# Patient Record
Sex: FEMALE | Race: WHITE
[De-identification: names, ages, dates, MRNs, and addresses within clinical notes are randomized per-mention and may not be internally consistent; named-entity substitution may affect disease eponyms.]

---

## 2018-01-16 ENCOUNTER — RECORDS - HEALTHEAST (OUTPATIENT)
Dept: ADMINISTRATIVE | Facility: OTHER | Age: 48
End: 2018-01-16

## 2018-02-13 ENCOUNTER — RECORDS - HEALTHEAST (OUTPATIENT)
Dept: ADMINISTRATIVE | Facility: OTHER | Age: 48
End: 2018-02-13

## 2018-02-27 ENCOUNTER — RECORDS - HEALTHEAST (OUTPATIENT)
Dept: ADMINISTRATIVE | Facility: OTHER | Age: 48
End: 2018-02-27

## 2018-03-01 ENCOUNTER — RECORDS - HEALTHEAST (OUTPATIENT)
Dept: ADMINISTRATIVE | Facility: OTHER | Age: 48
End: 2018-03-01

## 2018-03-14 ENCOUNTER — RECORDS - HEALTHEAST (OUTPATIENT)
Dept: ADMINISTRATIVE | Facility: OTHER | Age: 48
End: 2018-03-14

## 2018-03-15 ENCOUNTER — RECORDS - HEALTHEAST (OUTPATIENT)
Dept: ADMINISTRATIVE | Facility: OTHER | Age: 48
End: 2018-03-15

## 2018-03-20 ENCOUNTER — RECORDS - HEALTHEAST (OUTPATIENT)
Dept: ADMINISTRATIVE | Facility: OTHER | Age: 48
End: 2018-03-20

## 2018-04-07 ENCOUNTER — RECORDS - HEALTHEAST (OUTPATIENT)
Dept: ADMINISTRATIVE | Facility: OTHER | Age: 48
End: 2018-04-07

## 2018-04-09 ENCOUNTER — RECORDS - HEALTHEAST (OUTPATIENT)
Dept: ADMINISTRATIVE | Facility: OTHER | Age: 48
End: 2018-04-09

## 2018-05-10 ENCOUNTER — RECORDS - HEALTHEAST (OUTPATIENT)
Dept: ADMINISTRATIVE | Facility: OTHER | Age: 48
End: 2018-05-10

## 2018-05-10 LAB
CHOLEST SERPL-MCNC: 195 MG/DL (ref 100–199)
HDLC SERPL-MCNC: 46 MG/DL
HPV_EXT - HISTORICAL: NORMAL
LDLC SERPL CALC-MCNC: 113 MG/DL
NON HDL CHOL. (LDL+VLDL): 149 MG/DL
PAP SMEAR - HIM PATIENT REPORTED: NORMAL
TRIGLYCERIDES (HISTORICAL CONVERSION): 182 MG/DL

## 2018-07-10 ENCOUNTER — RECORDS - HEALTHEAST (OUTPATIENT)
Dept: ADMINISTRATIVE | Facility: OTHER | Age: 48
End: 2018-07-10

## 2018-07-26 ENCOUNTER — RECORDS - HEALTHEAST (OUTPATIENT)
Dept: ADMINISTRATIVE | Facility: OTHER | Age: 48
End: 2018-07-26

## 2018-07-30 ENCOUNTER — RECORDS - HEALTHEAST (OUTPATIENT)
Dept: ADMINISTRATIVE | Facility: OTHER | Age: 48
End: 2018-07-30

## 2018-08-03 ENCOUNTER — RECORDS - HEALTHEAST (OUTPATIENT)
Dept: ADMINISTRATIVE | Facility: OTHER | Age: 48
End: 2018-08-03

## 2018-08-10 ENCOUNTER — RECORDS - HEALTHEAST (OUTPATIENT)
Dept: ADMINISTRATIVE | Facility: OTHER | Age: 48
End: 2018-08-10

## 2018-09-07 ENCOUNTER — RECORDS - HEALTHEAST (OUTPATIENT)
Dept: ADMINISTRATIVE | Facility: OTHER | Age: 48
End: 2018-09-07

## 2018-10-23 ENCOUNTER — RECORDS - HEALTHEAST (OUTPATIENT)
Dept: ADMINISTRATIVE | Facility: OTHER | Age: 48
End: 2018-10-23

## 2018-12-17 ENCOUNTER — RECORDS - HEALTHEAST (OUTPATIENT)
Dept: ADMINISTRATIVE | Facility: OTHER | Age: 48
End: 2018-12-17

## 2019-01-04 ENCOUNTER — RECORDS - HEALTHEAST (OUTPATIENT)
Dept: ADMINISTRATIVE | Facility: OTHER | Age: 49
End: 2019-01-04

## 2019-01-08 ENCOUNTER — RECORDS - HEALTHEAST (OUTPATIENT)
Dept: ADMINISTRATIVE | Facility: OTHER | Age: 49
End: 2019-01-08

## 2019-01-15 ENCOUNTER — RECORDS - HEALTHEAST (OUTPATIENT)
Dept: ADMINISTRATIVE | Facility: OTHER | Age: 49
End: 2019-01-15

## 2019-01-18 ENCOUNTER — RECORDS - HEALTHEAST (OUTPATIENT)
Dept: ADMINISTRATIVE | Facility: OTHER | Age: 49
End: 2019-01-18

## 2019-01-29 ENCOUNTER — RECORDS - HEALTHEAST (OUTPATIENT)
Dept: ADMINISTRATIVE | Facility: OTHER | Age: 49
End: 2019-01-29

## 2019-02-13 ENCOUNTER — RECORDS - HEALTHEAST (OUTPATIENT)
Dept: ADMINISTRATIVE | Facility: OTHER | Age: 49
End: 2019-02-13

## 2019-03-11 ENCOUNTER — RECORDS - HEALTHEAST (OUTPATIENT)
Dept: ADMINISTRATIVE | Facility: OTHER | Age: 49
End: 2019-03-11

## 2019-03-12 ENCOUNTER — RECORDS - HEALTHEAST (OUTPATIENT)
Dept: ADMINISTRATIVE | Facility: OTHER | Age: 49
End: 2019-03-12

## 2019-03-15 ENCOUNTER — RECORDS - HEALTHEAST (OUTPATIENT)
Dept: ADMINISTRATIVE | Facility: OTHER | Age: 49
End: 2019-03-15

## 2019-04-03 ENCOUNTER — RECORDS - HEALTHEAST (OUTPATIENT)
Dept: ADMINISTRATIVE | Facility: OTHER | Age: 49
End: 2019-04-03

## 2019-05-23 ENCOUNTER — RECORDS - HEALTHEAST (OUTPATIENT)
Dept: ADMINISTRATIVE | Facility: OTHER | Age: 49
End: 2019-05-23

## 2019-05-28 ENCOUNTER — RECORDS - HEALTHEAST (OUTPATIENT)
Dept: ADMINISTRATIVE | Facility: OTHER | Age: 49
End: 2019-05-28

## 2019-06-03 ENCOUNTER — RECORDS - HEALTHEAST (OUTPATIENT)
Dept: ADMINISTRATIVE | Facility: OTHER | Age: 49
End: 2019-06-03

## 2019-07-16 ENCOUNTER — RECORDS - HEALTHEAST (OUTPATIENT)
Dept: ADMINISTRATIVE | Facility: OTHER | Age: 49
End: 2019-07-16

## 2019-10-03 ENCOUNTER — RECORDS - HEALTHEAST (OUTPATIENT)
Dept: ADMINISTRATIVE | Facility: OTHER | Age: 49
End: 2019-10-03

## 2019-11-11 ENCOUNTER — RECORDS - HEALTHEAST (OUTPATIENT)
Dept: ADMINISTRATIVE | Facility: OTHER | Age: 49
End: 2019-11-11

## 2019-11-25 ENCOUNTER — RECORDS - HEALTHEAST (OUTPATIENT)
Dept: ADMINISTRATIVE | Facility: OTHER | Age: 49
End: 2019-11-25

## 2019-11-25 LAB — HBA1C MFR BLD: 5.5 % (ref 0–5.6)

## 2019-12-17 ENCOUNTER — RECORDS - HEALTHEAST (OUTPATIENT)
Dept: ADMINISTRATIVE | Facility: OTHER | Age: 49
End: 2019-12-17

## 2019-12-28 ENCOUNTER — RECORDS - HEALTHEAST (OUTPATIENT)
Dept: ADMINISTRATIVE | Facility: OTHER | Age: 49
End: 2019-12-28

## 2020-01-06 ENCOUNTER — RECORDS - HEALTHEAST (OUTPATIENT)
Dept: ADMINISTRATIVE | Facility: OTHER | Age: 50
End: 2020-01-06

## 2020-01-19 ENCOUNTER — RECORDS - HEALTHEAST (OUTPATIENT)
Dept: ADMINISTRATIVE | Facility: OTHER | Age: 50
End: 2020-01-19

## 2020-01-22 ENCOUNTER — RECORDS - HEALTHEAST (OUTPATIENT)
Dept: ADMINISTRATIVE | Facility: OTHER | Age: 50
End: 2020-01-22

## 2020-01-24 ENCOUNTER — RECORDS - HEALTHEAST (OUTPATIENT)
Dept: ADMINISTRATIVE | Facility: OTHER | Age: 50
End: 2020-01-24

## 2020-01-27 ENCOUNTER — RECORDS - HEALTHEAST (OUTPATIENT)
Dept: ADMINISTRATIVE | Facility: OTHER | Age: 50
End: 2020-01-27

## 2020-01-27 ENCOUNTER — RECORDS - HEALTHEAST (OUTPATIENT)
Dept: HEALTH INFORMATION MANAGEMENT | Facility: CLINIC | Age: 50
End: 2020-01-27

## 2020-01-28 ENCOUNTER — RECORDS - HEALTHEAST (OUTPATIENT)
Dept: ADMINISTRATIVE | Facility: OTHER | Age: 50
End: 2020-01-28

## 2020-01-28 LAB
CHOLEST SERPL-MCNC: 191 MG/DL (ref 0–200)
HDLC SERPL-MCNC: 46 MG/DL (ref 40–60)
LDLC SERPL CALC-MCNC: 123 MG/DL
TRIGLYCERIDES (HISTORICAL CONVERSION): 122 MG/DL (ref 0–200)

## 2020-01-29 ENCOUNTER — RECORDS - HEALTHEAST (OUTPATIENT)
Dept: ADMINISTRATIVE | Facility: OTHER | Age: 50
End: 2020-01-29

## 2020-02-05 ENCOUNTER — RECORDS - HEALTHEAST (OUTPATIENT)
Dept: ADMINISTRATIVE | Facility: OTHER | Age: 50
End: 2020-02-05

## 2020-02-13 ENCOUNTER — RECORDS - HEALTHEAST (OUTPATIENT)
Dept: ADMINISTRATIVE | Facility: OTHER | Age: 50
End: 2020-02-13

## 2020-02-17 ENCOUNTER — RECORDS - HEALTHEAST (OUTPATIENT)
Dept: ADMINISTRATIVE | Facility: OTHER | Age: 50
End: 2020-02-17

## 2020-02-18 ENCOUNTER — RECORDS - HEALTHEAST (OUTPATIENT)
Dept: ADMINISTRATIVE | Facility: OTHER | Age: 50
End: 2020-02-18

## 2020-03-03 ENCOUNTER — RECORDS - HEALTHEAST (OUTPATIENT)
Dept: ADMINISTRATIVE | Facility: OTHER | Age: 50
End: 2020-03-03

## 2020-03-11 ENCOUNTER — HEALTH MAINTENANCE LETTER (OUTPATIENT)
Age: 50
End: 2020-03-11

## 2020-03-12 ENCOUNTER — OFFICE VISIT - HEALTHEAST (OUTPATIENT)
Dept: INTERNAL MEDICINE | Facility: CLINIC | Age: 50
End: 2020-03-12

## 2020-03-12 DIAGNOSIS — K75.81 NASH (NONALCOHOLIC STEATOHEPATITIS): ICD-10-CM

## 2020-03-12 DIAGNOSIS — R17 ELEVATED BILIRUBIN: ICD-10-CM

## 2020-03-12 DIAGNOSIS — Z90.49 HX OF CHOLECYSTECTOMY: ICD-10-CM

## 2020-03-12 DIAGNOSIS — K21.00 GASTROESOPHAGEAL REFLUX DISEASE WITH ESOPHAGITIS: ICD-10-CM

## 2020-03-12 DIAGNOSIS — M25.561 ACUTE PAIN OF BOTH KNEES: ICD-10-CM

## 2020-03-12 DIAGNOSIS — Z76.89 ESTABLISHING CARE WITH NEW DOCTOR, ENCOUNTER FOR: ICD-10-CM

## 2020-03-12 DIAGNOSIS — M25.562 ACUTE PAIN OF BOTH KNEES: ICD-10-CM

## 2020-03-12 DIAGNOSIS — K52.9 CHRONIC DIARRHEA: ICD-10-CM

## 2020-03-12 DIAGNOSIS — G47.33 OSA (OBSTRUCTIVE SLEEP APNEA): ICD-10-CM

## 2020-03-12 DIAGNOSIS — R79.89 ABNORMAL LIVER FUNCTION TESTS: ICD-10-CM

## 2020-03-12 LAB
ALBUMIN SERPL-MCNC: 3.6 G/DL (ref 3.5–5)
ALP SERPL-CCNC: 62 U/L (ref 45–120)
ALT SERPL W P-5'-P-CCNC: 47 U/L (ref 0–45)
AMYLASE SERPL-CCNC: 41 U/L (ref 5–120)
ANION GAP SERPL CALCULATED.3IONS-SCNC: 13 MMOL/L (ref 5–18)
AST SERPL W P-5'-P-CCNC: 38 U/L (ref 0–40)
BILIRUB SERPL-MCNC: 0.7 MG/DL (ref 0–1)
BUN SERPL-MCNC: 12 MG/DL (ref 8–22)
CALCIUM SERPL-MCNC: 9 MG/DL (ref 8.5–10.5)
CHLORIDE BLD-SCNC: 104 MMOL/L (ref 98–107)
CO2 SERPL-SCNC: 21 MMOL/L (ref 22–31)
CREAT SERPL-MCNC: 0.81 MG/DL (ref 0.6–1.1)
GFR SERPL CREATININE-BSD FRML MDRD: >60 ML/MIN/1.73M2
GGT SERPL-CCNC: 15 U/L (ref 0–50)
GLUCOSE BLD-MCNC: 87 MG/DL (ref 70–125)
LIPASE SERPL-CCNC: 68 U/L (ref 0–52)
POTASSIUM BLD-SCNC: 4 MMOL/L (ref 3.5–5)
PROT SERPL-MCNC: 6.9 G/DL (ref 6–8)
SODIUM SERPL-SCNC: 138 MMOL/L (ref 136–145)

## 2020-03-16 ENCOUNTER — HOSPITAL ENCOUNTER (OUTPATIENT)
Dept: ULTRASOUND IMAGING | Facility: CLINIC | Age: 50
Discharge: HOME | End: 2020-03-16
Attending: INTERNAL MEDICINE

## 2020-03-16 DIAGNOSIS — R17 ELEVATED BILIRUBIN: ICD-10-CM

## 2020-03-16 DIAGNOSIS — R79.89 ABNORMAL LIVER FUNCTION TESTS: ICD-10-CM

## 2020-03-17 ENCOUNTER — COMMUNICATION - HEALTHEAST (OUTPATIENT)
Dept: INTERNAL MEDICINE | Facility: CLINIC | Age: 50
End: 2020-03-17

## 2020-03-17 ENCOUNTER — RECORDS - HEALTHEAST (OUTPATIENT)
Dept: HEALTH INFORMATION MANAGEMENT | Facility: CLINIC | Age: 50
End: 2020-03-17

## 2020-05-24 ENCOUNTER — COMMUNICATION - HEALTHEAST (OUTPATIENT)
Dept: INTERNAL MEDICINE | Facility: CLINIC | Age: 50
End: 2020-05-24

## 2020-05-24 DIAGNOSIS — F41.8 DEPRESSION WITH ANXIETY: ICD-10-CM

## 2020-05-24 DIAGNOSIS — I10 ESSENTIAL HYPERTENSION: ICD-10-CM

## 2020-05-26 ENCOUNTER — COMMUNICATION - HEALTHEAST (OUTPATIENT)
Dept: SCHEDULING | Facility: CLINIC | Age: 50
End: 2020-05-26

## 2020-05-27 ENCOUNTER — OFFICE VISIT - HEALTHEAST (OUTPATIENT)
Dept: INTERNAL MEDICINE | Facility: CLINIC | Age: 50
End: 2020-05-27

## 2020-05-27 DIAGNOSIS — K21.00 GASTROESOPHAGEAL REFLUX DISEASE WITH ESOPHAGITIS: ICD-10-CM

## 2020-05-27 DIAGNOSIS — I10 ESSENTIAL HYPERTENSION: ICD-10-CM

## 2020-05-27 DIAGNOSIS — F41.8 DEPRESSION WITH ANXIETY: ICD-10-CM

## 2020-05-27 DIAGNOSIS — E03.9 HYPOTHYROIDISM (ACQUIRED): ICD-10-CM

## 2020-08-22 ENCOUNTER — COMMUNICATION - HEALTHEAST (OUTPATIENT)
Dept: INTERNAL MEDICINE | Facility: CLINIC | Age: 50
End: 2020-08-22

## 2020-08-22 DIAGNOSIS — K21.00 GASTROESOPHAGEAL REFLUX DISEASE WITH ESOPHAGITIS: ICD-10-CM

## 2021-01-04 ENCOUNTER — HEALTH MAINTENANCE LETTER (OUTPATIENT)
Age: 51
End: 2021-01-04

## 2021-03-07 ENCOUNTER — HEALTH MAINTENANCE LETTER (OUTPATIENT)
Age: 51
End: 2021-03-07

## 2021-04-25 ENCOUNTER — HEALTH MAINTENANCE LETTER (OUTPATIENT)
Age: 51
End: 2021-04-25

## 2021-05-07 ENCOUNTER — COMMUNICATION - HEALTHEAST (OUTPATIENT)
Dept: INTERNAL MEDICINE | Facility: CLINIC | Age: 51
End: 2021-05-07

## 2021-05-07 DIAGNOSIS — F41.8 DEPRESSION WITH ANXIETY: ICD-10-CM

## 2021-06-04 VITALS
DIASTOLIC BLOOD PRESSURE: 80 MMHG | OXYGEN SATURATION: 98 % | WEIGHT: 293 LBS | SYSTOLIC BLOOD PRESSURE: 131 MMHG | BODY MASS INDEX: 59.11 KG/M2 | HEART RATE: 71 BPM

## 2021-06-06 ENCOUNTER — RECORDS - HEALTHEAST (OUTPATIENT)
Dept: INTERNAL MEDICINE | Facility: CLINIC | Age: 51
End: 2021-06-06

## 2021-06-06 NOTE — PROGRESS NOTES
Assessment/Plan:        1. Establishing care with new doctor, encounter for     2. Gastroesophageal reflux disease with esophagitis  Ambulatory Referral for Upper GI Endoscopy    omeprazole (PRILOSEC OTC) 20 MG tablet   3. VAL (obstructive sleep apnea)     4. Abnormal liver function tests  US Abdomen Complete    Comprehensive Metabolic Panel    Lipase    Amylase    GGT (Gamma GT)   5. Elevated bilirubin  US Abdomen Complete    Comprehensive Metabolic Panel    Lipase    Amylase    GGT (Gamma GT)   6. Hx of cholecystectomy     7. Acute pain of both knees  Ambulatory referral to PT/OT   8. Chronic diarrhea  Ambulatory referral for Colonoscopy   9. CROCKER (nonalcoholic steatohepatitis)       Multiple issues, acute and chronic medical problems discussed today is also her past medical history, social history and family history.    1.  Hypertension she will continue same medications    2.  Hypothyroidism, TSH was good in January and she will continue same levothyroxine dose    3.  Obstructive sleep apnea, she will continue with the CPAP    4.  Anxiety, she will continue with Celexa    5.  Her preventive exams are up-to-date    6.  GERD, worse since Prilosec stopped.  She will restart omeprazole, she will schedule EGD.    7.  Chronic diarrhea with a normal stool studies, she will schedule colonoscopy to rule out inflammatory colitis.    8.  Abnormal liver test with elevated direct bilirubin and liver enzymes, she will schedule a liver ultrasound    9.  She will start physical therapy for acute bilateral knee pain.  Weight loss discussed as the most important step in the management of most of her problems.    This note has been dictated using voice recognition software. Any grammatical or context distortions are unintentional and inherent to the software.      Return in about 4 weeks (around 4/9/2020) for Recheck.    Patient Instructions   Start taking Probiotic daily.  Stop eating lactose.  Start taking Prilosec daily.  Stop  Pepcid.    Schedule EGD, colonoscopy and US abdomen.          Subjective:    Clara Bates is a 49 y.o. female  here for    Chief Complaint   Patient presents with     Establish Care     EC     49-year female is here to establish care in our clinic and her previous primary care provider was in [facility].    I was able to review a lot of documents, lab results and images from care everywhere.  She saw her primary care in January of this year when she was referred to gastroenterologist.  She had a lot of blood work done in January.  She had a chest x-ray.  Her last Pap smear was done in 2018.    Her chronic medical problems are  1.  Hypertension, on metoprolol and losartan and  hydrochlorothiazide for the last 20 years.    2.  Hypothyroidism, on levothyroxine for 20 years    3.  Anxiety and depression, on Celexa for the last 20 years.    4.  Obstructive sleep apnea, on CPAP    5.  She had cholecystectomy done    6.  She has chronic gastroesophageal reflux problem and she was on omeprazole for months and recently was switched by gastroenterologist to Pepcid but now her symptoms of heartburn are much worse over the last month.    7.She is complaining today about bilateral knee pain and had some spasm in the muscles in her upper legs.  She was referred for this to rheumatologist and had a CK checked multiple times which was normal.  The cramping stopped but her both knees hurt.  She tried some stretching exercise but that made her knees more painful.    8.  Reviewing the lab results from the primary clinic and gastroenterologist from the care everywhere, she had elevated bilirubin to 1.4 with elevated direct bili 1.0 and mild elevation of liver enzymes.  She had elevated liver enzymes throughout the years and gastroenterologist established diagnosis of CROCKER.  The rest of the labs from [facility] showed normal iron study negative celiac panel negative hepatitis B and C studies, normal INR, normal ERIKA, normal alpha-1  antitrypsin and anti-smooth muscle antibodies.    9.  She is complaining today about chronic diarrhea with 3-4 loose bowel movements since December.  That was discussed with her [facility] and primary care provider and stool studies were negative for infectious cause.  She had a normal colonoscopy in 2011.    Social History     Socioeconomic History     Marital status: Single     Spouse name: Not on file     Number of children: Not on file     Years of education: Not on file     Highest education level: Not on file   Occupational History     Not on file   Social Needs     Financial resource strain: Not on file     Food insecurity     Worry: Not on file     Inability: Not on file     Transportation needs     Medical: Not on file     Non-medical: Not on file   Tobacco Use     Smoking status: Never Smoker     Smokeless tobacco: Never Used   Substance and Sexual Activity     Alcohol use: Never     Frequency: Never     Drug use: Never     Sexual activity: Yes     Partners: Male     Birth control/protection: None   Lifestyle     Physical activity     Days per week: Not on file     Minutes per session: Not on file     Stress: Not on file   Relationships     Social connections     Talks on phone: Not on file     Gets together: Not on file     Attends Denominational service: Not on file     Active member of club or organization: Not on file     Attends meetings of clubs or organizations: Not on file     Relationship status: Not on file     Intimate partner violence     Fear of current or ex partner: Not on file     Emotionally abused: Not on file     Physically abused: Not on file     Forced sexual activity: Not on file   Other Topics Concern     Not on file   Social History Narrative     Not on file       Family History   Problem Relation Age of Onset     Thyroid disease Mother      Anxiety disorder Mother      Hypertension Father      Review of Systems:     A 12 point comprehensive review of systems was negative except as  noted in HPI.            Objective:    Physical Exam   /80   Pulse 71   Wt (!) 323 lb 3.2 oz (146.6 kg)   SpO2 98%   BMI 59.11 kg/m      Constitutional: oriented to person, place, and time, appears well-nourished. No distress.   HENT:   Head: Normocephalic.   Mouth/Throat: Oropharynx is clear and moist.   Eyes: Conjunctivae are normal. Pupils are equal, round, and reactive to light.   Neck: Normal range of motion. Neck supple.   Cardiovascular: Normal rate, regular rhythm and normal heart sounds.    Pulmonary/Chest: Effort normal and breath sounds normal.  Abdominal: Soft. Bowel sounds are normal.   Musculoskeletal: Normal range of motion.   Neurological: alert and oriented to person, place, and time.  Psychiatric:  normal mood and affect.    Patient Active Problem List   Diagnosis     Hypertension     Hypothyroidism (acquired)     Depression with anxiety     Gastroesophageal reflux disease with esophagitis     VAL (obstructive sleep apnea)     Hx of cholecystectomy     CROCKER (nonalcoholic steatohepatitis)       Current Outpatient Medications on File Prior to Visit   Medication Sig Dispense Refill     acetaminophen (TYLENOL) 500 MG tablet PRN. Max acetaminophen dose: 4000mg in 24 hrs.       cholecalciferol, vitamin D3, 1,000 unit tablet Take 2,000 Units by mouth daily.       citalopram (CELEXA) 20 MG tablet Take 20 mg by mouth daily.       famotidine (PEPCID) 20 MG tablet Take 1 tablet by mouth.       fluticasone (FLONASE) 50 mcg/actuation nasal spray 1 spray into each nostril daily.       levothyroxine (SYNTHROID, LEVOTHROID) 150 MCG tablet Take 150 mcg by mouth daily.       losartan-hydrochlorothiazide (HYZAAR) 100-25 mg per tablet Take 1 tablet by mouth daily.       metoprolol succinate (TOPROL-XL) 50 MG 24 hr tablet Take 50 mg by mouth bedtime.       miscellaneous medical supply Misc Replacement CPAP machine for home use at pressure: 10-20 cms,  Full face mask with cushion x 1, humidifier chamber x 1,  humidifier x 1       potassium 99 mg Tab 3 tablets once daily       [DISCONTINUED] escitalopram oxalate (LEXAPRO) 10 MG tablet Take 10 mg by mouth daily.       [DISCONTINUED] lisinopril-hydrochlorothiazide (PRINZIDE,ZESTORETIC) 20-25 mg per tablet Take 0.5 tablets by mouth daily.       No current facility-administered medications on file prior to visit.                Rod Maldonado  3/12/2020

## 2021-06-06 NOTE — PATIENT INSTRUCTIONS - HE
Start taking Probiotic daily.  Stop eating lactose.  Start taking Prilosec daily.  Stop Pepcid.    Schedule EGD, colonoscopy and US abdomen.

## 2021-06-08 NOTE — PROGRESS NOTES
"Clara Bates is a 49 y.o. female who is being evaluated via a billable video visit.      The patient has been notified of following:     \"This video visit will be conducted via a call between you and your physician/provider. We have found that certain health care needs can be provided without the need for an in-person physical exam.  This service lets us provide the care you need with a video conversation.  If a prescription is necessary we can send it directly to your pharmacy.  If lab work is needed we can place an order for that and you can then stop by our lab to have the test done at a later time.    Video visits are billed at different rates depending on your insurance coverage. Please reach out to your insurance provider with any questions.    If during the course of the call the physician/provider feels a video visit is not appropriate, you will not be charged for this service.\"    Patient has given verbal consent to a Video visit? Yes    Patient would like to receive their AVS by AVS Preference: Lily.    Patient would like the video invitation sent by: Text to cell phone: [phone]    Will anyone else be joining your video visit? No        Video Start Time: 11:10 AM    Additional provider notes:  Follow up chronic medical problems:  1. Depression and anxiety - feeling more depressed since COVID 19 and also recent break up with her boyfriend. They lived together for 7 years. She works from home. She lost 14 lbs with [employer] program. She is crying more, not suicidal, not sleeping well. Talking with her therapist. On citalopram 20 mg daily. Not willing to change medications or take anything for sleep since she has bad experience with other meds in the past. She will call me if not able to cope with the current situation.  2. HTN is stable on Hyzaar.  3. Hypothyroid, on levothyroxine, TSH checked in january.  4. GERD, much better since she is on Prilosec.  5. Diarrhea, resolved, on Probiotic.     GENERAL: " Healthy, alert and no distress  EYES: Eyes grossly normal to inspection. No discharge or erythema, or obvious scleral/conjunctival abnormalities.  RESP: No audible wheeze, cough, or visible cyanosis.  No visible retractions or increased work of breathing.    NEURO: Cranial nerves grossly intact. Mentation and speech appropriate for age.  PSYCH: Mentation appears normal, affect normal/bright, judgement and insight intact, normal speech and appearance well-groomed    F/u in 3-4 months or earlier if needed.  Video-Visit Details    Type of service:  Video Visit    Video End Time (time video stopped): 11:20 AM  Originating Location (pt. Location): Home    Distant Location (provider location):  [facility] INTERNAL MEDICINE     Platform used for Video Visit: Donita Maldonado MD

## 2021-06-08 NOTE — TELEPHONE ENCOUNTER
RN cannot approve Refill Request    RN can NOT refill this medication historical medication requested.      Gilda Blank, [facility] Triage/Med Refill 5/27/2020    Requested Prescriptions   Pending Prescriptions Disp Refills     citalopram (CELEXA) 20 MG tablet [Pharmacy Med Name: CITALOPRAM 20MG TABLETS] 90 tablet 0     Sig: TAKE 1 TABLET BY MOUTH EVERY DAY       [facility] Refill Protocol  Passed - 5/24/2020 10:12 AM        Passed - PCP or prescribing provider visit in last year     Last office visit with prescriber/PCP: 3/12/2020 Rod Maldonado MD OR same dept: 3/12/2020 Rod Maldonado MD OR same specialty: 3/12/2020 Rod Maldonado MD  Last physical: Visit date not found Last MTM visit: Visit date not found   Next visit within 3 mo: Visit date not found  Next physical within 3 mo: Visit date not found  Prescriber OR PCP: Rod Maldonado MD  Last diagnosis associated with med order: There are no diagnoses linked to this encounter.  If protocol passes may refill for 12 months if within 3 months of last provider visit (or a total of 15 months).                metoprolol succinate (TOPROL-XL) 50 MG 24 hr tablet [Pharmacy Med Name: METOPROLOL ER SUCCINATE 50MG TABS] 90 tablet 0     Sig: TAKE 1 TABLET BY MOUTH EVERY DAY       Beta-Blockers Refill Protocol Passed - 5/24/2020 10:12 AM        Passed - PCP or prescribing provider visit in past 12 months or next 3 months     Last office visit with prescriber/PCP: 3/12/2020 Rod Maldonado MD OR same dept: 3/12/2020 Rod Maldonado MD OR same specialty: 3/12/2020 Rod Maldonado MD  Last physical: Visit date not found Last MTM visit: Visit date not found   Next visit within 3 mo: Visit date not found  Next physical within 3 mo: Visit date not found  Prescriber OR PCP: Rod Maldonado MD  Last diagnosis associated with med order: There are no diagnoses linked to this encounter.  If protocol passes may refill for 12 months if within 3 months of last provider visit  (or a total of 15 months).             Passed - Blood pressure filed in past 12 months     BP Readings from Last 1 Encounters:   04/03/20 127/71                losartan-hydrochlorothiazide (HYZAAR) 100-25 mg per tablet [Pharmacy Med Name: LOSARTAN/HCTZ 100/25MG TABLETS] 90 tablet 0     Sig: TAKE 1 TABLET BY MOUTH EVERY DAY       Diuretics/Combination Diuretics Refill Protocol  Passed - 5/24/2020 10:12 AM        Passed - Visit with PCP or prescribing provider visit in past 12 months     Last office visit with prescriber/PCP: 3/12/2020 Rod Maldonado MD OR same dept: 3/12/2020 Rod Maldonado MD OR same specialty: 3/12/2020 Rod Maldonado MD  Last physical: Visit date not found Last MTM visit: Visit date not found   Next visit within 3 mo: Visit date not found  Next physical within 3 mo: Visit date not found  Prescriber OR PCP: Rod Maldonado MD  Last diagnosis associated with med order: There are no diagnoses linked to this encounter.  If protocol passes may refill for 12 months if within 3 months of last provider visit (or a total of 15 months).             Passed - Serum Potassium in past 12 months      Lab Results   Component Value Date    Potassium 4.0 03/12/2020             Passed - Serum Sodium in past 12 months      Lab Results   Component Value Date    Sodium 138 03/12/2020             Passed - Blood pressure on file in past 12 months     BP Readings from Last 1 Encounters:   04/03/20 127/71             Passed - Serum Creatinine in past 12 months      Creatinine   Date Value Ref Range Status   03/12/2020 0.81 0.60 - 1.10 mg/dL Final

## 2021-06-10 NOTE — TELEPHONE ENCOUNTER
Refill Approved    Rx renewed per Medication Renewal Policy. Medication was last renewed on 3/12/20.    Gilda Blank, [facility] Connection Triage/Med Refill 8/25/2020     Requested Prescriptions   Pending Prescriptions Disp Refills     omeprazole 20 mg TbEC [Pharmacy Med Name: OMEPRAZOLE 20MG (OTC) TABLETS]  0     Sig: TAKE 1 TABLET BY MOUTH DAILY.       GI Medications Refill Protocol Passed - 8/22/2020  5:24 PM        Passed - PCP or prescribing provider visit in last 12 or next 3 months.     Last office visit with prescriber/PCP: 3/12/2020 Rod Maldonado MD OR same dept: 3/12/2020 Rod Maldonado MD OR same specialty: 3/12/2020 Rod Maldonado MD  Last physical: Visit date not found Last MTM visit: Visit date not found   Next visit within 3 mo: Visit date not found  Next physical within 3 mo: Visit date not found  Prescriber OR PCP: Rod Maldonado MD  Last diagnosis associated with med order: 1. Gastroesophageal reflux disease with esophagitis  - omeprazole 20 mg TbEC [Pharmacy Med Name: OMEPRAZOLE 20MG (OTC) TABLETS]; TAKE 1 TABLET BY MOUTH DAILY.; Refill: 0    If protocol passes may refill for 12 months if within 3 months of last provider visit (or a total of 15 months).

## 2021-06-16 PROBLEM — G47.33 OSA (OBSTRUCTIVE SLEEP APNEA): Status: ACTIVE | Noted: 2020-03-12

## 2021-06-16 PROBLEM — Z90.49 HX OF CHOLECYSTECTOMY: Status: ACTIVE | Noted: 2020-03-12

## 2021-06-16 PROBLEM — J32.0 CHRONIC MAXILLARY SINUSITIS: Status: ACTIVE | Noted: 2020-04-03

## 2021-06-16 PROBLEM — K21.00 GASTROESOPHAGEAL REFLUX DISEASE WITH ESOPHAGITIS: Status: ACTIVE | Noted: 2020-03-12

## 2021-06-16 PROBLEM — J34.2 NASAL SEPTAL DEVIATION: Status: ACTIVE | Noted: 2020-04-03

## 2021-06-16 PROBLEM — K75.81 NASH (NONALCOHOLIC STEATOHEPATITIS): Status: ACTIVE | Noted: 2020-03-12

## 2021-06-17 NOTE — TELEPHONE ENCOUNTER
Refill Approved    Rx renewed per Medication Renewal Policy. Medication was last renewed on 5/27/2020.    Kelly Callahan, [facility] Connection Triage/Med Refill 5/7/2021     Requested Prescriptions   Pending Prescriptions Disp Refills     citalopram (CELEXA) 20 MG tablet [Pharmacy Med Name: CITALOPRAM 20MG TABLETS] 90 tablet 3     Sig: TAKE 1 TABLET BY MOUTH EVERY DAY       [facility] Refill Protocol  Passed - 5/7/2021  5:32 AM        Passed - PCP or prescribing provider visit in last year     Last office visit with prescriber/PCP: 3/12/2020 Rod Maldonado MD OR same dept: Visit date not found OR same specialty: 3/12/2020 Rod Maldonado MD  Last physical: Visit date not found Last MTM visit: Visit date not found   Next visit within 3 mo: Visit date not found  Next physical within 3 mo: Visit date not found  Prescriber OR PCP: Rod Maldonado MD  Last diagnosis associated with med order: 1. Depression with anxiety  - citalopram (CELEXA) 20 MG tablet [Pharmacy Med Name: CITALOPRAM 20MG TABLETS]; TAKE 1 TABLET BY MOUTH EVERY DAY  Dispense: 90 tablet; Refill: 3    If protocol passes may refill for 12 months if within 3 months of last provider visit (or a total of 15 months).

## 2021-06-20 NOTE — LETTER
Letter by Rod Maldonado MD at      Author: Rod Maldonado MD Service: -- Author Type: --    Filed:  Encounter Date: 3/17/2020 Status: (Other)         Clara Bates  [address] [place] [address]             March 17, 2020         Dear Ms. Bates,    Below are the results from your recent visit:    Resulted Orders   Comprehensive Metabolic Panel   Result Value Ref Range    Sodium 138 136 - 145 mmol/L    Potassium 4.0 3.5 - 5.0 mmol/L    Chloride 104 98 - 107 mmol/L    CO2 21 (L) 22 - 31 mmol/L    Anion Gap, Calculation 13 5 - 18 mmol/L    Glucose 87 70 - 125 mg/dL    BUN 12 8 - 22 mg/dL    Creatinine 0.81 0.60 - 1.10 mg/dL    GFR MDRD Af Amer >60 >60 mL/min/1.73m2    GFR MDRD Non Af Amer >60 >60 mL/min/1.73m2    Bilirubin, Total 0.7 0.0 - 1.0 mg/dL    Calcium 9.0 8.5 - 10.5 mg/dL    Protein, Total 6.9 6.0 - 8.0 g/dL    Albumin 3.6 3.5 - 5.0 g/dL    Alkaline Phosphatase 62 45 - 120 U/L    AST 38 0 - 40 U/L    ALT 47 (H) 0 - 45 U/L    Narrative    Fasting Glucose reference range is 70-99 mg/dL per  American Diabetes Association (ADA) guidelines.   Lipase   Result Value Ref Range    Lipase 68 (H) 0 - 52 U/L   Amylase   Result Value Ref Range    Amylase 41 5 - 120 U/L   GGT (Gamma GT)   Result Value Ref Range    GGT (Gamma GT) 15 0 - 50 U/L       Stable results. Liver tests are good. Pancreas test, lipase, is just mildly elevated.    Please call with questions or contact us using [employer].    Sincerely,        Electronically signed by Rod Maldonado MD

## 2021-06-25 NOTE — TELEPHONE ENCOUNTER
RN cannot approve Refill Request    RN can NOT refill this medication PCP messaged that patient is overdue for Office Visit. Last office visit: 3/12/2020 Rod Maldonado MD Last Physical: Visit date not found Last MTM visit: Visit date not found Last visit same specialty: 3/12/2020 Rod Maldonado MD.  Next visit within 3 mo: Visit date not found  Next physical within 3 mo: Visit date not found      Verenice [place], [facility] Triage/Med Refill 6/6/2021    Requested Prescriptions   Pending Prescriptions Disp Refills     losartan-hydrochlorothiazide (HYZAAR) 100-25 mg per tablet [Pharmacy Med Name: LOSARTAN/HCTZ 100/25MG TABLETS] 90 tablet 3     Sig: TAKE 1 TABLET BY MOUTH EVERY DAY       Diuretics/Combination Diuretics Refill Protocol  Failed - 6/6/2021  8:47 AM        Failed - Visit with PCP or prescribing provider visit in past 12 months     Last office visit with prescriber/PCP: 3/12/2020 Rod Maldonado MD OR same dept: Visit date not found OR same specialty: 3/12/2020 Rod Maldonado MD  Last physical: Visit date not found Last MTM visit: Visit date not found   Next visit within 3 mo: Visit date not found  Next physical within 3 mo: Visit date not found  Prescriber OR PCP: Rod Maldonado MD  Last diagnosis associated with med order: 1. Essential hypertension  - losartan-hydrochlorothiazide (HYZAAR) 100-25 mg per tablet [Pharmacy Med Name: LOSARTAN/HCTZ 100/25MG TABLETS]; TAKE 1 TABLET BY MOUTH EVERY DAY  Dispense: 90 tablet; Refill: 3    If protocol passes may refill for 12 months if within 3 months of last provider visit (or a total of 15 months).             Failed - Serum Potassium in past 12 months      No results found for: LN-POTASSIUM          Failed - Serum Sodium in past 12 months      No results found for: LN-SODIUM          Failed - Blood pressure on file in past 12 months     BP Readings from Last 1 Encounters:   04/03/20 127/71             Failed - Serum Creatinine in past 12 months       Creatinine   Date Value Ref Range Status   03/12/2020 0.81 0.60 - 1.10 mg/dL Final

## 2021-10-10 ENCOUNTER — HEALTH MAINTENANCE LETTER (OUTPATIENT)
Age: 51
End: 2021-10-10

## 2022-03-27 ENCOUNTER — HEALTH MAINTENANCE LETTER (OUTPATIENT)
Age: 52
End: 2022-03-27

## 2022-05-22 ENCOUNTER — HEALTH MAINTENANCE LETTER (OUTPATIENT)
Age: 52
End: 2022-05-22

## 2022-09-18 ENCOUNTER — HEALTH MAINTENANCE LETTER (OUTPATIENT)
Age: 52
End: 2022-09-18

## 2023-05-08 ENCOUNTER — HEALTH MAINTENANCE LETTER (OUTPATIENT)
Age: 53
End: 2023-05-08

## 2023-06-04 ENCOUNTER — HEALTH MAINTENANCE LETTER (OUTPATIENT)
Age: 53
End: 2023-06-04